# Patient Record
Sex: FEMALE | Race: WHITE
[De-identification: names, ages, dates, MRNs, and addresses within clinical notes are randomized per-mention and may not be internally consistent; named-entity substitution may affect disease eponyms.]

---

## 2021-12-03 ENCOUNTER — HOSPITAL ENCOUNTER (OUTPATIENT)
Dept: HOSPITAL 96 - M.WC | Age: 51
End: 2021-12-03
Attending: FAMILY MEDICINE
Payer: COMMERCIAL

## 2021-12-03 DIAGNOSIS — E89.0: ICD-10-CM

## 2021-12-03 DIAGNOSIS — L97.522: ICD-10-CM

## 2021-12-03 DIAGNOSIS — E11.621: Primary | ICD-10-CM

## 2021-12-03 DIAGNOSIS — E11.42: ICD-10-CM

## 2021-12-03 DIAGNOSIS — Z90.49: ICD-10-CM

## 2021-12-03 DIAGNOSIS — Z79.84: ICD-10-CM

## 2021-12-03 DIAGNOSIS — Z79.899: ICD-10-CM

## 2021-12-08 ENCOUNTER — HOSPITAL ENCOUNTER (INPATIENT)
Dept: HOSPITAL 96 - M.ERS | Age: 51
LOS: 8 days | Discharge: HOME | DRG: 853 | End: 2021-12-16
Attending: INTERNAL MEDICINE | Admitting: INTERNAL MEDICINE
Payer: COMMERCIAL

## 2021-12-08 VITALS — DIASTOLIC BLOOD PRESSURE: 80 MMHG | SYSTOLIC BLOOD PRESSURE: 136 MMHG

## 2021-12-08 VITALS — BODY MASS INDEX: 44.41 KG/M2 | WEIGHT: 293 LBS | HEIGHT: 68 IN

## 2021-12-08 DIAGNOSIS — L97.529: ICD-10-CM

## 2021-12-08 DIAGNOSIS — G47.33: ICD-10-CM

## 2021-12-08 DIAGNOSIS — J15.6: ICD-10-CM

## 2021-12-08 DIAGNOSIS — J12.82: ICD-10-CM

## 2021-12-08 DIAGNOSIS — I12.9: ICD-10-CM

## 2021-12-08 DIAGNOSIS — E11.621: ICD-10-CM

## 2021-12-08 DIAGNOSIS — E87.1: ICD-10-CM

## 2021-12-08 DIAGNOSIS — Z88.8: ICD-10-CM

## 2021-12-08 DIAGNOSIS — E11.65: ICD-10-CM

## 2021-12-08 DIAGNOSIS — E66.01: ICD-10-CM

## 2021-12-08 DIAGNOSIS — U07.1: ICD-10-CM

## 2021-12-08 DIAGNOSIS — L03.032: ICD-10-CM

## 2021-12-08 DIAGNOSIS — A41.9: Primary | ICD-10-CM

## 2021-12-08 DIAGNOSIS — N18.9: ICD-10-CM

## 2021-12-08 DIAGNOSIS — E11.52: ICD-10-CM

## 2021-12-08 DIAGNOSIS — E11.42: ICD-10-CM

## 2021-12-08 DIAGNOSIS — Z88.2: ICD-10-CM

## 2021-12-08 DIAGNOSIS — B96.89: ICD-10-CM

## 2021-12-08 DIAGNOSIS — A48.0: ICD-10-CM

## 2021-12-08 DIAGNOSIS — E11.22: ICD-10-CM

## 2021-12-08 DIAGNOSIS — E03.9: ICD-10-CM

## 2021-12-08 DIAGNOSIS — N39.0: ICD-10-CM

## 2021-12-08 DIAGNOSIS — N17.9: ICD-10-CM

## 2021-12-08 DIAGNOSIS — J96.01: ICD-10-CM

## 2021-12-08 NOTE — OP
[address]  [address]                    OPERATIVE REPORT              
_______________________________________________________________________________
 
Name:       LINH THOMPSON           Room:           [address] [place]  
M.R.#:  [id number]      Account #:      [id number]  
Admission:  12/09/21     Attend Phys:    Bc Phan MD 
Discharge:               Date of Birth:  [birth year]  
         Report #: [id number]
                                                                     [id number]
_______________________________________________________________________________
THIS REPORT FOR:  
 
cc:  Malgorzata Jay MD, Tuongvan T. MD Rizzi, Raymond M. DPM                                              ~
 
DATE OF SURGERY: 12/15/2021
 
PREOPERATIVE DIAGNOSIS:  Left gas gangrene foot.
 
POSTOPERATIVE DIAGNOSIS: Left gas gangrene foot.
 
PROCEDURE: Delayed wound closure with debridement, subcutaneous and tendon, 
approximately an 8 x 5 cm debridement area at about 2 cm in depth.  It was 
excisional.
 
ANESTHESIA:  General with a local block consisting of 10 mL of 0.5% Marcaine.
 
TOURNIQUET:  None.
 
DESCRIPTION OF PROCEDURE:  The patient was transferred to the operating room and
placed on the operating table in supine position.  General anesthesia was 
administered and left lower extremity was prepped and draped in the usual 
sterile manner.
 
Previous partial first ray resection was evaluated.  There was noted to be 
sparse areas and islands of necrotic tissue and friable tissue, which was 
removed with a 15 blade and Metzenbaum scissors.  I also trimmed up the edges of
the wound to make it healthy and take away any compromised tissue.
 
I noted good bleeding tissue and was done doing this and all tendinous material,
capsular material or tissue present I removed with the Metzenbaum scissors and 
tenotomy.
 
I washed it out thoroughly with normal saline, bacitracin and then closed the 
wound using 2-0 nylon simple suture technique.
 
Dressed with Xeroform, fluffs, Michelle, and Kerlix and Ace bandage.
 
Delayed wound closure and debridement were done.  This is subcutaneous 
excisional of the subcutaneous and tendinous tissue.
 
 
 
                       
                                        By:                                
                 
D: 12/15/21 1034_______________________________________
T: 12/15/21 1055Kash Ibarra DPM            /nt

## 2021-12-09 VITALS — DIASTOLIC BLOOD PRESSURE: 65 MMHG | SYSTOLIC BLOOD PRESSURE: 133 MMHG

## 2021-12-09 VITALS — SYSTOLIC BLOOD PRESSURE: 138 MMHG | DIASTOLIC BLOOD PRESSURE: 78 MMHG

## 2021-12-09 VITALS — DIASTOLIC BLOOD PRESSURE: 74 MMHG | SYSTOLIC BLOOD PRESSURE: 125 MMHG

## 2021-12-09 VITALS — SYSTOLIC BLOOD PRESSURE: 115 MMHG | DIASTOLIC BLOOD PRESSURE: 62 MMHG

## 2021-12-09 VITALS — DIASTOLIC BLOOD PRESSURE: 88 MMHG | SYSTOLIC BLOOD PRESSURE: 136 MMHG

## 2021-12-09 VITALS — DIASTOLIC BLOOD PRESSURE: 85 MMHG | SYSTOLIC BLOOD PRESSURE: 115 MMHG

## 2021-12-09 LAB
ABSOLUTE BASOPHILS: 0 THOU/UL (ref 0–0.2)
ABSOLUTE EOSINOPHILS: 0 THOU/UL (ref 0–0.7)
ABSOLUTE MONOCYTES: 0.8 THOU/UL (ref 0–1.2)
ALBUMIN SERPL-MCNC: 2.4 G/DL (ref 3.4–5)
ALP SERPL-CCNC: 95 U/L (ref 46–116)
ALT SERPL-CCNC: 48 U/L (ref 30–65)
ANION GAP SERPL CALC-SCNC: 13 MMOL/L (ref 7–16)
APTT BLD: 33.4 SECONDS (ref 25–31.3)
AST SERPL-CCNC: 42 U/L (ref 15–37)
BASOPHILS NFR BLD AUTO: 0.4 %
BE: -0.4 MMOL/L
BILIRUB SERPL-MCNC: 0.5 MG/DL
BILIRUB UR-MCNC: (no result) MG/DL
BUN SERPL-MCNC: 25 MG/DL (ref 7–18)
CALCIUM SERPL-MCNC: 8.9 MG/DL (ref 8.5–10.1)
CHLORIDE SERPL-SCNC: 91 MMOL/L (ref 98–107)
CO2 SERPL-SCNC: 25 MMOL/L (ref 21–32)
COLOR UR: YELLOW
CREAT SERPL-MCNC: 1.6 MG/DL (ref 0.6–1.3)
EOSINOPHIL NFR BLD: 0.3 %
FINE GRAN CASTS #/AREA URNS LPF: (no result) /LPF
GLUCOSE SERPL-MCNC: 281 MG/DL (ref 70–99)
GRANULOCYTES NFR BLD MANUAL: 84.8 %
HCT VFR BLD CALC: 30.5 % (ref 37–47)
HGB BLD-MCNC: 10.5 GM/DL (ref 12–15)
INR PPP: 1.1
KETONES UR STRIP-MCNC: (no result) MG/DL
LYMPHOCYTES # BLD: 1.1 THOU/UL (ref 0.8–5.3)
LYMPHOCYTES NFR BLD AUTO: 8.2 %
MAGNESIUM SERPL-MCNC: 2.3 MG/DL (ref 1.8–2.4)
MCH RBC QN AUTO: 30 PG (ref 26–34)
MCHC RBC AUTO-ENTMCNC: 34.4 G/DL (ref 28–37)
MCV RBC: 87 FL (ref 80–100)
MONOCYTES NFR BLD: 6.3 %
MPV: 6.8 FL. (ref 7.2–11.1)
NEUTROPHILS # BLD: 11.2 THOU/UL (ref 1.6–8.1)
NUCLEATED RBCS: 0 /100WBC
PCO2 BLD: 30.7 MMHG (ref 35–45)
PLATELET COUNT*: 504 THOU/UL (ref 150–400)
PO2 BLD: 57.2 MMHG (ref 75–100)
POTASSIUM SERPL-SCNC: 4.3 MMOL/L (ref 3.5–5.1)
PROT SERPL-MCNC: 8 G/DL (ref 6.4–8.2)
PROT UR QL STRIP: (no result)
PROTHROMBIN TIME: 11.7 SECONDS (ref 9.2–11.5)
RBC # BLD AUTO: 3.5 MIL/UL (ref 4.2–5)
RBC # UR STRIP: NEGATIVE /UL
RDW-CV: 14.3 % (ref 10.5–14.5)
SODIUM SERPL-SCNC: 129 MMOL/L (ref 136–145)
SP GR UR STRIP: 1.02 (ref 1–1.03)
SQUAMOUS: (no result) /LPF (ref 0–3)
URINE CLARITY: (no result)
URINE GLUCOSE-RANDOM: (no result)
URINE LEUKOCYTES-REFLEX: (no result)
URINE NITRITE-REFLEX: NEGATIVE
URINE WBC-REFLEX: (no result) /HPF (ref 0–5)
UROBILINOGEN UR STRIP-ACNC: 0.2 E.U./DL (ref 0.2–1)
WBC # BLD AUTO: 13.2 THOU/UL (ref 4–11)

## 2021-12-09 PROCEDURE — XW033E5 INTRODUCTION OF REMDESIVIR ANTI-INFECTIVE INTO PERIPHERAL VEIN, PERCUTANEOUS APPROACH, NEW TECHNOLOGY GROUP 5: ICD-10-PCS

## 2021-12-09 PROCEDURE — 0LBW0ZZ EXCISION OF LEFT FOOT TENDON, OPEN APPROACH: ICD-10-PCS

## 2021-12-09 PROCEDURE — 0Y6N0Z9 DETACHMENT AT LEFT FOOT, PARTIAL 1ST RAY, OPEN APPROACH: ICD-10-PCS | Performed by: INTERNAL MEDICINE

## 2021-12-09 NOTE — NUR
THE PATIENT ARRIVED AT 1650. THE PATIENT IS ALERT X4. LEFT TOE WOUND OPEN AND
DRAINAGE NOTED. DENIES PAIN. LAST BOWEL MOVEMENT 12/09/21. LUNG DIMINSHED. THE
PATIENT IS UP AD SOY. CALL LIGHT WITHIN REACH.

## 2021-12-10 VITALS — SYSTOLIC BLOOD PRESSURE: 121 MMHG | DIASTOLIC BLOOD PRESSURE: 72 MMHG

## 2021-12-10 VITALS — SYSTOLIC BLOOD PRESSURE: 139 MMHG | DIASTOLIC BLOOD PRESSURE: 68 MMHG

## 2021-12-10 VITALS — SYSTOLIC BLOOD PRESSURE: 117 MMHG | DIASTOLIC BLOOD PRESSURE: 66 MMHG

## 2021-12-10 VITALS — DIASTOLIC BLOOD PRESSURE: 80 MMHG | SYSTOLIC BLOOD PRESSURE: 147 MMHG

## 2021-12-10 VITALS — DIASTOLIC BLOOD PRESSURE: 74 MMHG | SYSTOLIC BLOOD PRESSURE: 122 MMHG

## 2021-12-10 LAB
ABSOLUTE BASOPHILS: 0.1 THOU/UL (ref 0–0.2)
ABSOLUTE MONOCYTES: 0.5 THOU/UL (ref 0–1.2)
ALBUMIN SERPL-MCNC: 1.9 G/DL (ref 3.4–5)
ALP SERPL-CCNC: 97 U/L (ref 46–116)
ALT SERPL-CCNC: 43 U/L (ref 30–65)
ANION GAP SERPL CALC-SCNC: 12 MMOL/L (ref 7–16)
AST SERPL-CCNC: 36 U/L (ref 15–37)
BASOPHILS NFR BLD AUTO: 1 %
BILIRUB SERPL-MCNC: 0.3 MG/DL
BUN SERPL-MCNC: 29 MG/DL (ref 7–18)
CALCIUM SERPL-MCNC: 8.8 MG/DL (ref 8.5–10.1)
CHLORIDE SERPL-SCNC: 98 MMOL/L (ref 98–107)
CO2 SERPL-SCNC: 24 MMOL/L (ref 21–32)
CREAT SERPL-MCNC: 1.6 MG/DL (ref 0.6–1.3)
GLUCOSE SERPL-MCNC: 298 MG/DL (ref 70–99)
GRANULOCYTES NFR BLD MANUAL: 81 %
HCT VFR BLD CALC: 26.6 % (ref 37–47)
HCT VFR BLD CALC: 28 % (ref 37–47)
HGB BLD-MCNC: 8.7 GM/DL (ref 12–15)
HGB BLD-MCNC: 9.3 GM/DL (ref 12–15)
LYMPHOCYTES # BLD: 1.3 THOU/UL (ref 0.8–5.3)
LYMPHOCYTES NFR BLD AUTO: 11 %
MCH RBC QN AUTO: 29.6 PG (ref 26–34)
MCHC RBC AUTO-ENTMCNC: 33.3 G/DL (ref 28–37)
MCV RBC: 88.7 FL (ref 80–100)
MONOCYTES NFR BLD: 4 %
MPV: 6.8 FL. (ref 7.2–11.1)
NEUTROPHILS # BLD: 9.9 THOU/UL (ref 1.6–8.1)
NEUTS BAND NFR BLD: 3 %
NUCLEATED RBCS: 0 /100WBC
PLATELET # BLD EST: (no result) 10*3/UL
PLATELET COUNT*: 526 THOU/UL (ref 150–400)
POTASSIUM SERPL-SCNC: 4 MMOL/L (ref 3.5–5.1)
PROT SERPL-MCNC: 7.1 G/DL (ref 6.4–8.2)
RBC # BLD AUTO: 3.15 MIL/UL (ref 4.2–5)
RBC MORPH BLD: NORMAL
RDW-CV: 14.1 % (ref 10.5–14.5)
SODIUM SERPL-SCNC: 134 MMOL/L (ref 136–145)
WBC # BLD AUTO: 11.8 THOU/UL (ref 4–11)

## 2021-12-10 NOTE — [place] [address]                    HISTORY AND PHYSICAL          
_______________________________________________________________________________
 
Name:       LINH THOMPSON           Room:           [address] [place]  
M.R.#:  [id number]      Account #:      [id number]  
Admission:  12/09/21     Attend Phys:    Bc Phan MD 
Discharge:               Date of Birth:  [birth year]  
         Report #: [id number]
                                                                     [id number]
_______________________________________________________________________________
THIS REPORT FOR:  
 
cc:  Malgorzata Jay MD, Tuongvan T. MD Rizzi, Raymond M. DPM                                              ~
 
ADMIT DATE: 12/09/2021
 
INTRODUCTION:  This is a 50-year-old white female with a left foot infection 
with gas gangrene.
 
HISTORY OF PRESENT ILLNESS:  This is a 50-year-old white female who was seen at 
[facility] with a left foot necrotic hallux.
 
The patient said the wound was first seen by another physician on Friday, but 
the wound has been progressing since then, increased redness, increased 
swelling, increased drainage and odor.
 
PAST MEDICAL HISTORY:  Noted for diabetes, neuropathy, hypertension, 
hypothyroidism.
 
MEDICATIONS:  Farxiga, metformin, lisinopril, Ozempic and levothyroxine.
 
ALLERGIES:  SULFA.
 
FAMILY HISTORY:  Noncontributory
 
PREVIOUS SURGERIES: Noncontributory.
 
SOCIAL HISTORY:  The patient has no history of recreational drugs.  No history 
of alcohol use.
 
PHYSICAL EXAMINATION:
GENERAL:  Well-developed, well-nourished female in no acute distress.
HEENT:  PERRLA.
NECK:  Supple.
HEART:  Regular rate and rhythm.
LUNGS:  Clear to auscultation.
EXTREMITIES:  Lower extremity exam shows a very erythematous left foot, 
especially in the medial column and first and second areas with redness over the
total dorsal aspect of the foot.  There is a strong odor and that left hallux 
looks nonviable.  She has an area about 4 cm proximal to the base of the 
proximal phalanx of the hallux.  There is necrotic patch and a purplish hue 
presenting as most likely necrotic tissue.  There is also some fluctuance in 
this area.
 
 
 
 
[place] [address]                    HISTORY AND PHYSICAL          
_______________________________________________________________________________
 
Name:       LINH THOMPSON           Room:           [address] [place]  
..#:  [id number]      Account #:      [id number]  
Admission:  12/09/21     Attend Phys:    Bc Phan MD 
Discharge:               Date of Birth:  [birth year]  
         Report #: [id number]
                                                                     [id number]
_______________________________________________________________________________
 
DIAGNOSTIC DATA:  X-ray showed gas in this area.
 
LABORATORY DATA:  The patient's blood sugar is 281.  White blood cell count is 
13.2.
 
Blood flow is palpable to posterior tib, but not the dorsalis pedis of the left 
foot.
 
ASSESSMENT AND PLAN:  The patient has gas gangrene of left foot with a necrotic 
left hallux.
 
The patient is set up for a surgery as soon as possible for a partial first ray 
resection.  This surgery will be staged.  We will try to see if we can save as 
much as the foot as possible by doing just a minimal today of removing all 
necrotic tissue and stopping the process of this gas gangrene and significant 
infection.
 
The patient understands she has a possibility of having a transmet here in the 
next couple days as well depending on what we see intraoperatively.
 
The patient is n.p.o. and I have talked to the ER involved and also the OR for 
surgery as soon as possible.
 
 
 
 
 
 
 
 
 
 
 
 
 
 
 
 
 
 
 
 
 
<ELECTRONICALLY SIGNED>
                                        By:  Kash Ibarra DPM         
12/10/21     1858
D: 12/09/21 1249_______________________________________
T: 12/09/21 1335Kash Ibarra DPM            /nt

## 2021-12-10 NOTE — NUR
ASSUMED CARE AT 1030. PT IS ALERT AND ORIENTED. ASSESSMENT DONE. PT IS ON
3LITERS OF OXYGEN. PT HAD UNEVENTFUL DAY. WILL CONTINUE TO PROVIDE CARE FOR
PATIENT.

## 2021-12-10 NOTE — NUR
PT AO X4, WENT TO SURGERY ON RT FOOT. SURGICAL BANDAGE WRAPPED WITH ACE. PT UP
TO BSC AND THEN HAD SOME BLEEDING. FOOT ELEVATED AND DRAINAGE OUTLINED FOR
MONITOR. NO COMPLAINT OF PAIN. VSS, PT ON 2L PER NC CALL LIGHT IN REACH AND
ENCOURAGED.

## 2021-12-10 NOTE — NUR
CM FOLLOWUP
 
PT NOT MED CLEAR. PT POSTOP AND MAY NEED IV ANTIBIOTICS. PT EXPECTED TO REMAIN
AT [facility] THROUGH [facility]EEKNED. CM TO FOLLOW.

## 2021-12-10 NOTE — NUR
Pt is admitted to the hospital on 12/9/21 with Toe Ulcer, Cellulitis, and
Covid Pneumonia. Pt is alert and oriented. Pt lives in a house with 4 steps to
enter. Pt reports she was independent with ADL's and Mobility.  NO hx of DME,
HH, or SNF.  Pt had an initial appointment in the wound care center but was
schedulted for her second appointment today. Pt has no hx of IV antibiotics at
home.  CM to follow for possible need of IV antibiotics at home and home
health.

## 2021-12-10 NOTE — OP
[address]  [address]                    OPERATIVE REPORT              
_______________________________________________________________________________
 
Name:       LINH THOMPSON           Room:           [address] [place]  
M.R.#:  [id number]      Account #:      [id number]  
Admission:  12/09/21     Attend Phys:    Bc Phan MD 
Discharge:               Date of Birth:  [birth year]  
         Report #: [id number]
                                                                     [id number]
_______________________________________________________________________________
THIS REPORT FOR:  
 
cc:  Malgorzata Jay MD, Tuongvan T. MD Rizzi, Raymond M. DPM                                              ~
 
DATE OF SURGERY: 12/09/2021
 
PREOPERATIVE DIAGNOSIS:  Left foot gas gangrene, medial foot.
 
POSTOPERATIVE DIAGNOSIS: Left foot gas gangrene, medial foot.
 
PROCEDURES:
1.  Partial first ray resection, left foot.
2.  Debridement, left foot subcutaneous and tendon, 6 cm x 3 cm.
 
ANESTHESIA:  General.
 
TOURNIQUET:  None.
 
DESCRIPTION OF PROCEDURE:  The patient was transported to the operating room and
placed on the operating table in supine position.  It should be noted that the 
patient was positive for COVID-19, so necessary precautions were taken.  After 
the patient was prepped and draped in the usual manner, an incision was made 
along the dorsal aspect of the first MPJ with a racquet-type incision around the
hallux, disarticulating the hallux from the first metatarsal.  It should be 
noted that when I made the incision, significant amount of purulence was noted 
and gas was released.  Deep tissue was sent for aerobic and fungal and then I 
dissected free the metatarsal and resected approximately one-half of it.  I also
removed the sesamoids.  I noted that the infection was traveling down plantarly 
along the tendon sheath and somewhat dorsally and laterally.  I cleaned out all 
necrotic tissue, questionable infected tissue in this area as well and got to 
mostly good bleeding tissue.  I made a second incision going lateral across the 
metatarsal heads where the infection was traveling and tunneling.  I removed any
questionable tissue including deep fascia and part of the tendon as well.  This 
left only good bleeding tissue.  I washed out thoroughly and I loosely closed it
with retention sutures with 2-0 nylon.  I wanted to keep the flaps ____.  
Dressed with fluffs, Kerlix and Ace bandage.  The patient tolerated the 
procedure well and left the operating room in stable condition with vital signs 
and vascular status intact.  The patient will have the second surgery sometime 
soon, probably in the next 3-5 days, most likely on a Monday or Tuesday ____ 
Thursday.  Today is Thursday night.  The patient will be weightbearing in a 
 
 
 
[place] [address]                    OPERATIVE REPORT              
_______________________________________________________________________________
 
Name:       LINH THOMPSON           Room:           [address] [place]  
..#:  [id number]      Account #:      [id number]  
Admission:  12/09/21     Attend Phys:    Bc Phan MD 
Discharge:               Date of Birth:  [birth year]  
         Report #: [id number]
                                                                     [id number]
_______________________________________________________________________________
 
postop shoe for balance and pivoting and may take couple of steps.  I expect the
patient will do well.  She is able to be compliant.
 
 
 
 
 
 
 
 
 
 
 
 
 
 
 
 
 
 
 
 
 
 
 
 
 
 
 
 
 
 
 
 
 
 
 
 
 
 
 
 
 
<ELECTRONICALLY SIGNED>
                                        By:  Kash Ibarra DPM         
12/10/21     1858
D: 12/09/21 1931_______________________________________
T: 12/09/21 2000Raprosper Ibarra DPM            /nt

## 2021-12-11 VITALS — DIASTOLIC BLOOD PRESSURE: 84 MMHG | SYSTOLIC BLOOD PRESSURE: 150 MMHG

## 2021-12-11 VITALS — SYSTOLIC BLOOD PRESSURE: 148 MMHG | DIASTOLIC BLOOD PRESSURE: 77 MMHG

## 2021-12-11 VITALS — SYSTOLIC BLOOD PRESSURE: 139 MMHG | DIASTOLIC BLOOD PRESSURE: 68 MMHG

## 2021-12-11 VITALS — SYSTOLIC BLOOD PRESSURE: 149 MMHG | DIASTOLIC BLOOD PRESSURE: 89 MMHG

## 2021-12-11 VITALS — DIASTOLIC BLOOD PRESSURE: 82 MMHG | SYSTOLIC BLOOD PRESSURE: 141 MMHG

## 2021-12-11 LAB
ABSOLUTE BASOPHILS: 0.1 THOU/UL (ref 0–0.2)
ABSOLUTE EOSINOPHILS: 0 THOU/UL (ref 0–0.7)
ABSOLUTE MONOCYTES: 0.8 THOU/UL (ref 0–1.2)
ALBUMIN SERPL-MCNC: 1.8 G/DL (ref 3.4–5)
ALP SERPL-CCNC: 93 U/L (ref 46–116)
ALT SERPL-CCNC: 37 U/L (ref 30–65)
ANION GAP SERPL CALC-SCNC: 9 MMOL/L (ref 7–16)
AST SERPL-CCNC: 22 U/L (ref 15–37)
BASOPHILS NFR BLD AUTO: 0.5 %
BILIRUB SERPL-MCNC: 0.1 MG/DL
BUN SERPL-MCNC: 35 MG/DL (ref 7–18)
CALCIUM SERPL-MCNC: 8.6 MG/DL (ref 8.5–10.1)
CHLORIDE SERPL-SCNC: 101 MMOL/L (ref 98–107)
CO2 SERPL-SCNC: 25 MMOL/L (ref 21–32)
CREAT SERPL-MCNC: 1.6 MG/DL (ref 0.6–1.3)
EOSINOPHIL NFR BLD: 0 %
GLUCOSE SERPL-MCNC: 285 MG/DL (ref 70–99)
GRANULOCYTES NFR BLD MANUAL: 86.9 %
HCT VFR BLD CALC: 26.6 % (ref 37–47)
HGB BLD-MCNC: 9.1 GM/DL (ref 12–15)
LYMPHOCYTES # BLD: 1 THOU/UL (ref 0.8–5.3)
LYMPHOCYTES NFR BLD AUTO: 6.8 %
MCH RBC QN AUTO: 30.1 PG (ref 26–34)
MCHC RBC AUTO-ENTMCNC: 34.1 G/DL (ref 28–37)
MCV RBC: 88.2 FL (ref 80–100)
MONOCYTES NFR BLD: 5.8 %
MPV: 7.2 FL. (ref 7.2–11.1)
NEUTROPHILS # BLD: 12.2 THOU/UL (ref 1.6–8.1)
NUCLEATED RBCS: 0 /100WBC
PLATELET COUNT*: 535 THOU/UL (ref 150–400)
POTASSIUM SERPL-SCNC: 4.7 MMOL/L (ref 3.5–5.1)
PREALB SERPL-MCNC: 11.5 MG/DL (ref 18–35.7)
PROT SERPL-MCNC: 6.8 G/DL (ref 6.4–8.2)
RBC # BLD AUTO: 3.02 MIL/UL (ref 4.2–5)
RDW-CV: 14.3 % (ref 10.5–14.5)
SODIUM SERPL-SCNC: 135 MMOL/L (ref 136–145)
WBC # BLD AUTO: 14 THOU/UL (ref 4–11)

## 2021-12-11 NOTE — NUR
PT AO X4, VSS THIS PM SHIFT. SHE IS PO DAY 1 FOR LT GRT TOE SURG. SHE DENIES
PAIN. DSG HAS DRIED BLOOD BUT NO ACTIVE BLEEDING AT THIS TIME, SHE HAS
SURGICAL SHOE ON FOR PROTECTION AND IS USING WALKER WITH HEEL TOUCH TO PIVOT
TO [facility] WITH NO PROBLEM. PT IS ON 3L NC AND REPORTS A COUGH THAT HAS THICK
WHITE PRODUCTION. THIS RN WILL HAVE DAYSHIFT CLARIFY IF PT IS TO HAVE MRI
BEFORE NEXT SURG AT BEGINING OF WEEK AS WELL AS WHEN DSG CHANGES SHOULD START.
CALL LIGHT IN REACH FOR PT SAFETY.

## 2021-12-12 VITALS — SYSTOLIC BLOOD PRESSURE: 165 MMHG | DIASTOLIC BLOOD PRESSURE: 86 MMHG

## 2021-12-12 VITALS — SYSTOLIC BLOOD PRESSURE: 152 MMHG | DIASTOLIC BLOOD PRESSURE: 88 MMHG

## 2021-12-12 VITALS — DIASTOLIC BLOOD PRESSURE: 82 MMHG | SYSTOLIC BLOOD PRESSURE: 137 MMHG

## 2021-12-12 VITALS — DIASTOLIC BLOOD PRESSURE: 80 MMHG | SYSTOLIC BLOOD PRESSURE: 166 MMHG

## 2021-12-12 VITALS — SYSTOLIC BLOOD PRESSURE: 140 MMHG | DIASTOLIC BLOOD PRESSURE: 79 MMHG

## 2021-12-12 LAB
ABSOLUTE BASOPHILS: 0 THOU/UL (ref 0–0.2)
ABSOLUTE EOSINOPHILS: 0 THOU/UL (ref 0–0.7)
ABSOLUTE MONOCYTES: 0.8 THOU/UL (ref 0–1.2)
ALBUMIN SERPL-MCNC: 1.8 G/DL (ref 3.4–5)
ALP SERPL-CCNC: 98 U/L (ref 46–116)
ALT SERPL-CCNC: 32 U/L (ref 30–65)
ANION GAP SERPL CALC-SCNC: 8 MMOL/L (ref 7–16)
AST SERPL-CCNC: 21 U/L (ref 15–37)
BASOPHILS NFR BLD AUTO: 0.2 %
BILIRUB SERPL-MCNC: 0.2 MG/DL
BUN SERPL-MCNC: 35 MG/DL (ref 7–18)
CALCIUM SERPL-MCNC: 8.3 MG/DL (ref 8.5–10.1)
CHLORIDE SERPL-SCNC: 103 MMOL/L (ref 98–107)
CO2 SERPL-SCNC: 26 MMOL/L (ref 21–32)
CREAT SERPL-MCNC: 1.4 MG/DL (ref 0.6–1.3)
EOSINOPHIL NFR BLD: 0.1 %
GLUCOSE SERPL-MCNC: 262 MG/DL (ref 70–99)
GRANULOCYTES NFR BLD MANUAL: 82.1 %
HCT VFR BLD CALC: 29.7 % (ref 37–47)
HGB BLD-MCNC: 10 GM/DL (ref 12–15)
LYMPHOCYTES # BLD: 1.2 THOU/UL (ref 0.8–5.3)
LYMPHOCYTES NFR BLD AUTO: 10.7 %
MCH RBC QN AUTO: 29.7 PG (ref 26–34)
MCHC RBC AUTO-ENTMCNC: 33.5 G/DL (ref 28–37)
MCV RBC: 88.6 FL (ref 80–100)
MONOCYTES NFR BLD: 6.9 %
MPV: 6.8 FL. (ref 7.2–11.1)
NEUTROPHILS # BLD: 9 THOU/UL (ref 1.6–8.1)
NUCLEATED RBCS: 0 /100WBC
PLATELET COUNT*: 591 THOU/UL (ref 150–400)
POTASSIUM SERPL-SCNC: 4.4 MMOL/L (ref 3.5–5.1)
PREALB SERPL-MCNC: 14.4 MG/DL (ref 18–35.7)
PROT SERPL-MCNC: 6.5 G/DL (ref 6.4–8.2)
RBC # BLD AUTO: 3.36 MIL/UL (ref 4.2–5)
RDW-CV: 14.4 % (ref 10.5–14.5)
SODIUM SERPL-SCNC: 137 MMOL/L (ref 136–145)
WBC # BLD AUTO: 10.9 THOU/UL (ref 4–11)

## 2021-12-12 NOTE — NUR
ASSUMED CARE OF PT AFTER REPORT AT 1930. PT A&OX4. VSS. PHYSICAL ASSESSMENT
COMPLETED AND CHARTED. PT ON O2 AT 3L NC. PT TRACING SR ON TELE. PT DENIES ANY
PAIN. MAINTAINED ON ENHANCED PRECAUTIONS. CALL LIGHT WITHIN REACH.

## 2021-12-13 VITALS — SYSTOLIC BLOOD PRESSURE: 116 MMHG | DIASTOLIC BLOOD PRESSURE: 56 MMHG

## 2021-12-13 VITALS — SYSTOLIC BLOOD PRESSURE: 141 MMHG | DIASTOLIC BLOOD PRESSURE: 69 MMHG

## 2021-12-13 VITALS — DIASTOLIC BLOOD PRESSURE: 66 MMHG | SYSTOLIC BLOOD PRESSURE: 146 MMHG

## 2021-12-13 VITALS — DIASTOLIC BLOOD PRESSURE: 93 MMHG | SYSTOLIC BLOOD PRESSURE: 172 MMHG

## 2021-12-13 VITALS — DIASTOLIC BLOOD PRESSURE: 63 MMHG | SYSTOLIC BLOOD PRESSURE: 127 MMHG

## 2021-12-13 VITALS — SYSTOLIC BLOOD PRESSURE: 140 MMHG | DIASTOLIC BLOOD PRESSURE: 72 MMHG

## 2021-12-13 LAB
ABSOLUTE BASOPHILS: 0 THOU/UL (ref 0–0.2)
ABSOLUTE EOSINOPHILS: 0 THOU/UL (ref 0–0.7)
ABSOLUTE MONOCYTES: 0.8 THOU/UL (ref 0–1.2)
ALBUMIN SERPL-MCNC: 1.9 G/DL (ref 3.4–5)
ALP SERPL-CCNC: 102 U/L (ref 46–116)
ALT SERPL-CCNC: 35 U/L (ref 30–65)
ANION GAP SERPL CALC-SCNC: 10 MMOL/L (ref 7–16)
AST SERPL-CCNC: 22 U/L (ref 15–37)
BASOPHILS NFR BLD AUTO: 0.1 %
BILIRUB SERPL-MCNC: 0.2 MG/DL
BUN SERPL-MCNC: 31 MG/DL (ref 7–18)
CALCIUM SERPL-MCNC: 8.3 MG/DL (ref 8.5–10.1)
CHLORIDE SERPL-SCNC: 102 MMOL/L (ref 98–107)
CO2 SERPL-SCNC: 25 MMOL/L (ref 21–32)
CREAT SERPL-MCNC: 1.7 MG/DL (ref 0.6–1.3)
EOSINOPHIL NFR BLD: 0.4 %
GLUCOSE SERPL-MCNC: 254 MG/DL (ref 70–99)
GRANULOCYTES NFR BLD MANUAL: 75.7 %
HCT VFR BLD CALC: 28.2 % (ref 37–47)
HGB BLD-MCNC: 9.5 GM/DL (ref 12–15)
LYMPHOCYTES # BLD: 1.8 THOU/UL (ref 0.8–5.3)
LYMPHOCYTES NFR BLD AUTO: 16.2 %
MCH RBC QN AUTO: 29.8 PG (ref 26–34)
MCHC RBC AUTO-ENTMCNC: 33.7 G/DL (ref 28–37)
MCV RBC: 88.5 FL (ref 80–100)
MONOCYTES NFR BLD: 7.6 %
MPV: 6.7 FL. (ref 7.2–11.1)
NEUTROPHILS # BLD: 8.3 THOU/UL (ref 1.6–8.1)
NUCLEATED RBCS: 0 /100WBC
PLATELET COUNT*: 584 THOU/UL (ref 150–400)
POTASSIUM SERPL-SCNC: 4.4 MMOL/L (ref 3.5–5.1)
PROT SERPL-MCNC: 6.6 G/DL (ref 6.4–8.2)
RBC # BLD AUTO: 3.19 MIL/UL (ref 4.2–5)
RDW-CV: 14.3 % (ref 10.5–14.5)
SODIUM SERPL-SCNC: 137 MMOL/L (ref 136–145)
WBC # BLD AUTO: 11 THOU/UL (ref 4–11)

## 2021-12-13 NOTE — NUR
Nutrition: Pt admitted with COVID. Assessed for high BMI. Wt: 350#. NPO for
possible toe surgery today. Meds: insulin, albuterol. Labs: -192, alb
1.9, prealb 18.4. PMHx: DM, OBE, CRF, PN. Diabetic toe ulcer. Once diet
advances after surgery, consider Brian for wound healing. Consider mild
nutrition risk at this time. RD will follow up 12/16/21.

## 2021-12-13 NOTE — NUR
ASSUMED CARE OF PT AFTER REPORT AT 1930. PT A&OX4. VSS. PHYSICAL ASSESSMENT
COMPLETED AND CHARTED. PT ON RA. PT TRACING SR ON TELE. PT DENIES ANY PAIN.
MAINTAINED ON ENHANCED PRECAUTIONS. PT INSTRUCTED ON NPO POST MIDNIGHT FOR
POSSIBLE SURGERY TODAY. COMMUNICATES UNDERSTANDING. CALL LIGHT WITHIN REACH.

## 2021-12-13 NOTE — NUR
CM FOLLOWUP
 
PT NOT YET MEDICALLY CLEAR AS A SECOND LOOK SURGERY IS PENDING. PT MAY NEED IV
ANTIBIOTICS UPON DC. PT AWAITING [facility] PAPERWORK FOR PROVIDER TO REVIEW. CM TO
FOLLOWUP FOR DC PLANNING NEEDS.

## 2021-12-14 VITALS — SYSTOLIC BLOOD PRESSURE: 158 MMHG | DIASTOLIC BLOOD PRESSURE: 92 MMHG

## 2021-12-14 VITALS — DIASTOLIC BLOOD PRESSURE: 78 MMHG | SYSTOLIC BLOOD PRESSURE: 124 MMHG

## 2021-12-14 VITALS — SYSTOLIC BLOOD PRESSURE: 151 MMHG | DIASTOLIC BLOOD PRESSURE: 77 MMHG

## 2021-12-14 VITALS — DIASTOLIC BLOOD PRESSURE: 86 MMHG | SYSTOLIC BLOOD PRESSURE: 145 MMHG

## 2021-12-14 VITALS — SYSTOLIC BLOOD PRESSURE: 140 MMHG | DIASTOLIC BLOOD PRESSURE: 96 MMHG

## 2021-12-14 LAB
ABSOLUTE BASOPHILS: 0 THOU/UL (ref 0–0.2)
ABSOLUTE EOSINOPHILS: 0.1 THOU/UL (ref 0–0.7)
ABSOLUTE MONOCYTES: 0.7 THOU/UL (ref 0–1.2)
ALBUMIN SERPL-MCNC: 2 G/DL (ref 3.4–5)
ALP SERPL-CCNC: 101 U/L (ref 46–116)
ALT SERPL-CCNC: 44 U/L (ref 30–65)
ANION GAP SERPL CALC-SCNC: 8 MMOL/L (ref 7–16)
AST SERPL-CCNC: 39 U/L (ref 15–37)
BASOPHILS NFR BLD AUTO: 0 %
BILIRUB SERPL-MCNC: 0.2 MG/DL
BUN SERPL-MCNC: 32 MG/DL (ref 7–18)
CALCIUM SERPL-MCNC: 8.4 MG/DL (ref 8.5–10.1)
CHLORIDE SERPL-SCNC: 102 MMOL/L (ref 98–107)
CO2 SERPL-SCNC: 27 MMOL/L (ref 21–32)
CREAT SERPL-MCNC: 1.3 MG/DL (ref 0.6–1.3)
EOSINOPHIL NFR BLD: 1 %
GLUCOSE SERPL-MCNC: 207 MG/DL (ref 70–99)
GRANULOCYTES NFR BLD MANUAL: 75 %
HCT VFR BLD CALC: 30 % (ref 37–47)
HGB BLD-MCNC: 9.8 GM/DL (ref 12–15)
LYMPHOCYTES # BLD: 2.1 THOU/UL (ref 0.8–5.3)
LYMPHOCYTES NFR BLD AUTO: 18 %
MCH RBC QN AUTO: 29.1 PG (ref 26–34)
MCHC RBC AUTO-ENTMCNC: 32.8 G/DL (ref 28–37)
MCV RBC: 88.6 FL (ref 80–100)
MONOCYTES NFR BLD: 6 %
MPV: 6.5 FL. (ref 7.2–11.1)
NEUTROPHILS # BLD: 8.7 THOU/UL (ref 1.6–8.1)
NUCLEATED RBCS: 0 /100WBC
PLATELET # BLD EST: ADEQUATE 10*3/UL
PLATELET COUNT*: 604 THOU/UL (ref 150–400)
POTASSIUM SERPL-SCNC: 4.3 MMOL/L (ref 3.5–5.1)
PROT SERPL-MCNC: 6.7 G/DL (ref 6.4–8.2)
RBC # BLD AUTO: 3.39 MIL/UL (ref 4.2–5)
RBC MORPH BLD: NORMAL
RDW-CV: 14.3 % (ref 10.5–14.5)
SODIUM SERPL-SCNC: 137 MMOL/L (ref 136–145)
WBC # BLD AUTO: 11.6 THOU/UL (ref 4–11)

## 2021-12-14 PROCEDURE — 05HC33Z INSERTION OF INFUSION DEVICE INTO LEFT BASILIC VEIN, PERCUTANEOUS APPROACH: ICD-10-PCS

## 2021-12-14 NOTE — NUR
Pt had MID line placed to MARTHA per PICC RN.  IV to  dc'd.  Reports feeling
well today.  VSS.  Will be NPO after MN tonight for surgery to foot tomorrow.
Will continue to monitor.

## 2021-12-14 NOTE — NUR
ASSUMED CARE OF PT AFTER REPORT AT 1930. PT A&OX4. VSS. PHYSICAL ASSESSMENT
COMPLETED AND CHARTED. PT ON RA. PT TRACING SR ON TELE. PT DENIES ANY
PAIN.CALL LIGHT WITHIN REACH.

## 2021-12-14 NOTE — NUR
CM FOLLOWUP
 
PT NOT YET MED CLEAR AND ANTICIPATED TO HAVE A SECOND SURGERY ON 12/15/21. PT
MAY NEED IV ANTIBIOTICS UPON DC. CM TO FOLLOW FOR DC PLANNING NEEDS.

## 2021-12-15 VITALS — SYSTOLIC BLOOD PRESSURE: 170 MMHG | DIASTOLIC BLOOD PRESSURE: 84 MMHG

## 2021-12-15 VITALS — DIASTOLIC BLOOD PRESSURE: 63 MMHG | SYSTOLIC BLOOD PRESSURE: 131 MMHG

## 2021-12-15 VITALS — SYSTOLIC BLOOD PRESSURE: 184 MMHG | DIASTOLIC BLOOD PRESSURE: 95 MMHG

## 2021-12-15 VITALS — SYSTOLIC BLOOD PRESSURE: 134 MMHG | DIASTOLIC BLOOD PRESSURE: 62 MMHG

## 2021-12-15 VITALS — SYSTOLIC BLOOD PRESSURE: 152 MMHG | DIASTOLIC BLOOD PRESSURE: 85 MMHG

## 2021-12-15 PROCEDURE — 0YQN0ZZ REPAIR LEFT FOOT, OPEN APPROACH: ICD-10-PCS

## 2021-12-15 PROCEDURE — 0LBW0ZZ EXCISION OF LEFT FOOT TENDON, OPEN APPROACH: ICD-10-PCS

## 2021-12-15 NOTE — PATH
[address]  [address]                    PATHOLOGY RPT PROCEDURE       
_______________________________________________________________________________
 
Name:       LINH THOMPSON           Room:           [address] [place]  
M.R.#:  [id number]      Account #:      [id number]  
Admission:  12/09/21     Date of Birth:  [birth year]  
Discharge:                             Report #:    [id number]
                                                         Path Case #: [id number]
_______________________________________________________________________________
 
[facility] Accession Number: [id number]
.                                                                01
Material submitted:                                        .
hallux - LEFT GREAT TOE. Modifiers: left
.                                                                01
Clinical history:                                          .
METATARSAL HEAD RESECTION
.                                                                02
**********************************************************************
Diagnosis:
Left great toe:
- Benign great toe with nonspecific ulceration, necrosis and extensive
acute inflammation of soft tissues and osteomyelitis of underlying
phalangeal bone.
- Separate benign osteocartilaginous segment with acute inflammation of
attached soft tissues and without osteomyelitis.
(LEANDER:reba; 12/14/2021)
MBFREDERIC  12/14/2021  1635 Local
**********************************************************************
.                                                                02
Electronically signed:                                     .
Froylan Zarate MD, Pathologist
NPI- [id number]
.                                                                01
Gross description:                                         .
The specimen is received in formalin, labeled "Linh Thompson, left
great toe" and consists of a disarticulated amputated toe (0.7 cm in
length by 0.6 x 3.0 cm) which displays a discoid, well-circumscribed ulcer
(1.8 x 1.7 cm) on the distal ventral surface that comes to within 3.3 cm
from the nearest surgical margin (inked black).  The entirety of the
dorsal surface displays a green to blue-purple, peeling area of
discoloration (6.2 x 5.5 cm) that involves the surgical margin.  The soft
tissue at the surgical margin is dusky and the articular surface is smooth
and unremarkable.  The attached nail is yellow and focally thickened.
Sectioning reveals focally softened and necrotic underlying bone.
Also received in the same container are 3 gray-tan, rubbery, dusky and
necrotic portions of soft irregular tissues (3.5 x 3.0 x 1.2 cm in
aggregate).  Sectioning reveals dusky and necrotic cut surfaces.
Also received in the same container is an additional bone segment (3.5 cm
in length by 2.3 x 2.0 cm) with abundant attached soft tissue.  One end is
smooth, hard and previously transected (inked blue), the opposing end
displays a smooth unremarkable articular surface.  Sectioning reveals
unremarkable underlying bone.  Representative sections are submitted
following decalcification as follows:
A1: Skin and underlying soft tissue at toe surgical margin, submitted en
face, represented
A2: Bone at margin, submitted on edge, represented
A3: Distal lengthwise sectioned of toe to show ulcer and underlying
 
[place] [address]                    PATHOLOGY RPT PROCEDURE       
_______________________________________________________________________________
 
Name:       LINH THOMPSON           Room:           [address] [place]  
M.R.#:  [id number]      Account #:      [id number]  
Admission:  12/09/21     Date of Birth:  [birth year]  
Discharge:                             Report #:    [id number]
                                                         Path Case #: [id number]
_______________________________________________________________________________
softened bone, represented
A4: Ventral area of discoloration, represented
A5: Additional soft tissues, represented
A6-A7: A contiguous bisected section of the additional bone segment,
represented ([place]; 12/13/2021)
DKA/DKA  12/13/2021  1141 Local
.                                                                02
Pathologist provided ICD-10:
L97.529, I96, M79.9, M86.172
.                                                                02
CPT                                                        .
953490, 557305
Specimen Comment: A courtesy copy of this report has been sent to [phone]
[phone]
Specimen Comment: [id number], [phone]
Specimen Comment: Report sent to , DR COSME / DR HERNANDEZ
***Performed at:  01
   [facility]
   [address] [place] [address] [address], [place], [place]  [id number]
   MD John Frye MD Phone:  [phone]
***Performed at:  02
   [facility] [address], [place], [place]  [id number]
   MD Froylan Zarate MD Phone:  [phone]

## 2021-12-15 NOTE — NUR
CM FOLLOWUP
 
PT NOT MED CLEAR, BUT MAY BE MED CLEAR 12/16/21.PT MAY NEED IV ANTIBIOTICS
UPON DC. CM TO FOLLOW FOR DC NEEDS.

## 2021-12-15 NOTE — NUR
ASSUMED CARE OF PT AFTER REPORT AT 1930. PT A&OX4. VSS. PHYSICAL ASSESSMENT
COMPLETED AND CHARTED. PT ON RA. PT TRACING SR ON TELE. INSTRUCTED ON NPO POST
MIDNIGHT FOR SURGERY TODAY. COMMUNICATES UNDERTSTANDING. CALL LIGHT WITHIN
REACH.

## 2021-12-16 VITALS — SYSTOLIC BLOOD PRESSURE: 153 MMHG | DIASTOLIC BLOOD PRESSURE: 87 MMHG

## 2021-12-16 VITALS — DIASTOLIC BLOOD PRESSURE: 87 MMHG | SYSTOLIC BLOOD PRESSURE: 153 MMHG

## 2021-12-16 VITALS — SYSTOLIC BLOOD PRESSURE: 143 MMHG | DIASTOLIC BLOOD PRESSURE: 84 MMHG

## 2021-12-16 VITALS — SYSTOLIC BLOOD PRESSURE: 136 MMHG | DIASTOLIC BLOOD PRESSURE: 78 MMHG

## 2021-12-16 VITALS — SYSTOLIC BLOOD PRESSURE: 119 MMHG | DIASTOLIC BLOOD PRESSURE: 60 MMHG

## 2021-12-16 NOTE — NUR
Nutrition: reassessment. Pt has discharge orders. Wt is stable. Protein stores
trending up, alb 2, prealb 28.7.

## 2021-12-16 NOTE — NUR
ASSUMED CARE OF PT AFTER REPORT AT 1930. PT A&OX4. VSS. PHYSICAL ASSESSMENT
COMPLETED AND CHARTED. PT ON RA. PT TRACING SR/SB ON TELE. PT DENIES ANY
PAIN. CALL LIGHT WITHIN REACH.